# Patient Record
Sex: FEMALE | Race: WHITE | NOT HISPANIC OR LATINO | URBAN - METROPOLITAN AREA
[De-identification: names, ages, dates, MRNs, and addresses within clinical notes are randomized per-mention and may not be internally consistent; named-entity substitution may affect disease eponyms.]

---

## 2019-12-19 ENCOUNTER — OFFICE VISIT (OUTPATIENT)
Dept: URBAN - METROPOLITAN AREA CLINIC 17 | Facility: CLINIC | Age: 74
End: 2019-12-19
Payer: COMMERCIAL

## 2019-12-19 DIAGNOSIS — H25.813 COMBINED FORMS OF AGE-RELATED CATARACT, BILATERAL: Primary | ICD-10-CM

## 2019-12-19 PROCEDURE — 99213 OFFICE O/P EST LOW 20 MIN: CPT | Performed by: OPTOMETRIST

## 2019-12-19 ASSESSMENT — INTRAOCULAR PRESSURE
OS: 14
OD: 17

## 2019-12-19 NOTE — IMPRESSION/PLAN
Impression: Combined forms of age-related cataract, bilateral: H25.813. Plan: Cataracts account for the patient's complaints. No treatment currently recommended. The patient will monitor vision changes and contact us with any decrease in vision. Patient states she is not having trouble with vision at this time.

## 2021-02-10 ENCOUNTER — OFFICE VISIT (OUTPATIENT)
Dept: URBAN - METROPOLITAN AREA CLINIC 17 | Facility: CLINIC | Age: 76
End: 2021-02-10
Payer: COMMERCIAL

## 2021-02-10 DIAGNOSIS — H11.153 PINGUECULA, BILATERAL: ICD-10-CM

## 2021-02-10 PROCEDURE — 99213 OFFICE O/P EST LOW 20 MIN: CPT | Performed by: OPTOMETRIST

## 2021-02-10 ASSESSMENT — INTRAOCULAR PRESSURE
OS: 18
OD: 17

## 2021-02-10 NOTE — IMPRESSION/PLAN
Impression: Combined forms of age-related cataract, bilateral: H25.813. Plan: Discussed diagnosis with patient. Cataract evaluation is recommended. Will refer to Dr. Benito Mohan for CAT Eval. Pt wishes to proceed. Discussed with patient she does not meet legal requirements for driving. Patient wishes to schedule appt after having COVID vaccine.

## 2021-03-29 ENCOUNTER — OFFICE VISIT (OUTPATIENT)
Dept: URBAN - METROPOLITAN AREA CLINIC 17 | Facility: CLINIC | Age: 76
End: 2021-03-29
Payer: COMMERCIAL

## 2021-03-29 DIAGNOSIS — H25.13 AGE-RELATED NUCLEAR CATARACT, BILATERAL: Primary | ICD-10-CM

## 2021-03-29 DIAGNOSIS — H43.811 VITREOUS DEGENERATION, RIGHT EYE: ICD-10-CM

## 2021-03-29 PROCEDURE — 99204 OFFICE O/P NEW MOD 45 MIN: CPT | Performed by: OPHTHALMOLOGY

## 2021-03-29 ASSESSMENT — VISUAL ACUITY
OD: 20/30
OS: 20/30

## 2021-03-29 ASSESSMENT — KERATOMETRY
OD: 47.50
OS: 47.00

## 2021-03-29 ASSESSMENT — INTRAOCULAR PRESSURE
OD: 16
OS: 15

## 2021-03-29 NOTE — IMPRESSION/PLAN
Impression: Age-related nuclear cataract, bilateral: H25.13. Condition: established, worsening. Symptoms: could improve with surgery. Plan: Cataract accounts for patient's complaints. Reviewed risks, benefits, and procedure. Patient desires surgery, schedule ce/iol OS then OD, RL2, discussed lens options, distance refractive target, patient is clear for surgery in [facility].

## 2021-04-05 ENCOUNTER — PRE-OPERATIVE VISIT (OUTPATIENT)
Dept: URBAN - METROPOLITAN AREA CLINIC 17 | Facility: CLINIC | Age: 76
End: 2021-04-05
Payer: COMMERCIAL

## 2021-04-05 ASSESSMENT — PACHYMETRY
OS: 222.32
OD: 22.21
OD: 2.93
OS: 2.90

## 2021-04-19 ENCOUNTER — SURGERY (OUTPATIENT)
Dept: URBAN - METROPOLITAN AREA SURGERY 7 | Facility: SURGERY | Age: 76
End: 2021-04-19
Payer: COMMERCIAL

## 2021-04-19 PROCEDURE — 66984 XCAPSL CTRC RMVL W/O ECP: CPT | Performed by: OPHTHALMOLOGY

## 2021-04-20 ENCOUNTER — POST-OPERATIVE VISIT (OUTPATIENT)
Dept: URBAN - METROPOLITAN AREA CLINIC 17 | Facility: CLINIC | Age: 76
End: 2021-04-20
Payer: COMMERCIAL

## 2021-04-20 PROCEDURE — 99024 POSTOP FOLLOW-UP VISIT: CPT | Performed by: OPTOMETRIST

## 2021-04-20 ASSESSMENT — INTRAOCULAR PRESSURE
OS: 15
OD: 28
OD: 11

## 2021-04-20 NOTE — IMPRESSION/PLAN
Impression: S/P Cataract Extraction by phacoemulsification with IOL placement [id number] OS - 1 Day. Encounter for surgical aftercare following surgery on a sense organ  Z48.810. Post operative instructions reviewed - Plan: Return in 1 wk for PO2 --Advised patient to use artificial tears for comfort.

## 2021-04-26 ENCOUNTER — POST-OPERATIVE VISIT (OUTPATIENT)
Dept: URBAN - METROPOLITAN AREA CLINIC 17 | Facility: CLINIC | Age: 76
End: 2021-04-26
Payer: COMMERCIAL

## 2021-04-26 PROCEDURE — 99024 POSTOP FOLLOW-UP VISIT: CPT | Performed by: OPTOMETRIST

## 2021-04-26 ASSESSMENT — INTRAOCULAR PRESSURE
OD: 13
OS: 11

## 2021-04-26 ASSESSMENT — VISUAL ACUITY: OS: 20/30-

## 2021-04-26 NOTE — IMPRESSION/PLAN
Impression: S/P Cataract Extraction by phacoemulsification with IOL placement [id number] OS - 7 Days. Encounter for surgical aftercare following surgery on a sense organ  Z48.810. Plan: 1 wk, 2nd eye --Advised patient to use artificial tears for comfort.

## 2021-05-03 ENCOUNTER — SURGERY (OUTPATIENT)
Dept: URBAN - METROPOLITAN AREA SURGERY 7 | Facility: SURGERY | Age: 76
End: 2021-05-03
Payer: COMMERCIAL

## 2021-05-03 PROCEDURE — 66984 XCAPSL CTRC RMVL W/O ECP: CPT | Performed by: OPHTHALMOLOGY

## 2021-05-04 ENCOUNTER — POST-OPERATIVE VISIT (OUTPATIENT)
Dept: URBAN - METROPOLITAN AREA CLINIC 17 | Facility: CLINIC | Age: 76
End: 2021-05-04
Payer: COMMERCIAL

## 2021-05-04 PROCEDURE — 99024 POSTOP FOLLOW-UP VISIT: CPT | Performed by: OPTOMETRIST

## 2021-05-04 ASSESSMENT — INTRAOCULAR PRESSURE
OD: 17
OS: 11

## 2021-05-11 ENCOUNTER — POST-OPERATIVE VISIT (OUTPATIENT)
Dept: URBAN - METROPOLITAN AREA CLINIC 17 | Facility: CLINIC | Age: 76
End: 2021-05-11
Payer: COMMERCIAL

## 2021-05-11 PROCEDURE — 99024 POSTOP FOLLOW-UP VISIT: CPT | Performed by: OPTOMETRIST

## 2021-05-11 ASSESSMENT — INTRAOCULAR PRESSURE
OD: 14
OS: 15

## 2021-05-11 ASSESSMENT — VISUAL ACUITY
OD: 20/40
OS: 20/25

## 2021-05-11 NOTE — IMPRESSION/PLAN
Impression: S/P Cataract Extraction by phacoemulsification with IOL placement [id number] OD - 8 Days. Encounter for surgical aftercare following surgery on a sense organ  Z48.810. Plan: 3 WK PO3 --Advised patient to use artificial tears for comfort. 
***Dexycu

## 2021-06-07 ENCOUNTER — POST-OPERATIVE VISIT (OUTPATIENT)
Dept: URBAN - METROPOLITAN AREA CLINIC 17 | Facility: CLINIC | Age: 76
End: 2021-06-07
Payer: COMMERCIAL

## 2021-06-07 DIAGNOSIS — T85.29XA MECH COMPL OF INTRAOCULAR LENS, INITIAL ENCOUNTER: ICD-10-CM

## 2021-06-07 PROCEDURE — 99024 POSTOP FOLLOW-UP VISIT: CPT | Performed by: OPHTHALMOLOGY

## 2021-06-07 ASSESSMENT — VISUAL ACUITY
OD: 20/40
OS: 20/20

## 2021-06-07 ASSESSMENT — INTRAOCULAR PRESSURE
OS: 13
OD: 11

## 2021-06-07 NOTE — IMPRESSION/PLAN
Impression: [facility] compl of intraocular lens, initial encounter: T85.29xA. Plan: Explained to patient that we can go back to the OR and exchange the IOL for a dist target but she will need glasses for computer work and reading up close. Patient desires surgery, schedule IOL exchange OS then OD, RL2, standard lens, distance refractive target, patient is clear for surgery in [place] [address].

## 2021-07-01 ENCOUNTER — SURGERY (OUTPATIENT)
Dept: URBAN - METROPOLITAN AREA SURGERY 7 | Facility: SURGERY | Age: 76
End: 2021-07-01
Payer: COMMERCIAL

## 2021-07-01 PROCEDURE — 66986 EXCHANGE LENS PROSTHESIS: CPT | Performed by: OPHTHALMOLOGY

## 2021-07-02 ENCOUNTER — POST-OPERATIVE VISIT (OUTPATIENT)
Dept: URBAN - METROPOLITAN AREA CLINIC 17 | Facility: CLINIC | Age: 76
End: 2021-07-02
Payer: COMMERCIAL

## 2021-07-02 PROCEDURE — 99024 POSTOP FOLLOW-UP VISIT: CPT | Performed by: OPHTHALMOLOGY

## 2021-07-02 ASSESSMENT — INTRAOCULAR PRESSURE
OS: 13
OD: 14

## 2021-07-02 NOTE — IMPRESSION/PLAN
Impression: S/P IOL Exchange OS - 1 Day. Encounter for surgical aftercare following surgery on a sense organ  Z48.810. Plan: Call if any problems develop. Do not rub operated eye. Wear eye shield for 1 week when sleeping. Red eye warning given. No jarring activity. --Advised patient to use artificial tears for comfort.

## 2021-07-08 ENCOUNTER — POST-OPERATIVE VISIT (OUTPATIENT)
Dept: URBAN - METROPOLITAN AREA CLINIC 17 | Facility: CLINIC | Age: 76
End: 2021-07-08
Payer: COMMERCIAL

## 2021-07-08 DIAGNOSIS — Z48.810 ENCOUNTER FOR SURGICAL AFTERCARE FOLLOWING SURGERY ON A SENSE ORGAN: Primary | ICD-10-CM

## 2021-07-08 PROCEDURE — 99024 POSTOP FOLLOW-UP VISIT: CPT | Performed by: OPTOMETRIST

## 2021-07-08 ASSESSMENT — INTRAOCULAR PRESSURE
OD: 12
OS: 18

## 2021-07-08 NOTE — IMPRESSION/PLAN
Impression: S/P IOL Exchange OS - 7 Days. Encounter for surgical aftercare following surgery on a sense organ  Z48.810. Post operative instructions reviewed - Plan: 2nd eye order --Advised patient to use artificial tears for comfort.

## 2021-07-15 ENCOUNTER — SURGERY (OUTPATIENT)
Dept: URBAN - METROPOLITAN AREA SURGERY 7 | Facility: SURGERY | Age: 76
End: 2021-07-15
Payer: COMMERCIAL

## 2021-07-15 PROCEDURE — 66986 EXCHANGE LENS PROSTHESIS: CPT | Performed by: OPHTHALMOLOGY

## 2021-07-16 ENCOUNTER — POST-OPERATIVE VISIT (OUTPATIENT)
Dept: URBAN - METROPOLITAN AREA CLINIC 17 | Facility: CLINIC | Age: 76
End: 2021-07-16
Payer: COMMERCIAL

## 2021-07-16 PROCEDURE — 99024 POSTOP FOLLOW-UP VISIT: CPT | Performed by: OPTOMETRIST

## 2021-07-16 ASSESSMENT — INTRAOCULAR PRESSURE
OS: 13
OD: 16

## 2021-07-16 NOTE — IMPRESSION/PLAN
Impression: S/P IOL Exchange 71436 OD - 1 Day. Presence of intraocular lens  Z96.1. Plan: Return in 1 week --Advised patient to use artificial tears for comfort.

## 2021-07-22 ENCOUNTER — POST-OPERATIVE VISIT (OUTPATIENT)
Dept: URBAN - METROPOLITAN AREA CLINIC 17 | Facility: CLINIC | Age: 76
End: 2021-07-22
Payer: COMMERCIAL

## 2021-07-22 PROCEDURE — 99024 POSTOP FOLLOW-UP VISIT: CPT | Performed by: OPTOMETRIST

## 2021-07-22 ASSESSMENT — INTRAOCULAR PRESSURE
OS: 13
OD: 13

## 2021-07-22 ASSESSMENT — VISUAL ACUITY: OD: 20/70

## 2021-07-22 NOTE — IMPRESSION/PLAN
Impression: S/P IOL Exchange 58309 OD - 7 Days. Presence of intraocular lens  Z96.1. Plan: Return in 3 weeks.

## 2021-09-01 ENCOUNTER — POST-OPERATIVE VISIT (OUTPATIENT)
Dept: URBAN - METROPOLITAN AREA CLINIC 17 | Facility: CLINIC | Age: 76
End: 2021-09-01
Payer: COMMERCIAL

## 2021-09-01 DIAGNOSIS — Z96.1 PRESENCE OF INTRAOCULAR LENS: Primary | ICD-10-CM

## 2021-09-01 PROCEDURE — 99024 POSTOP FOLLOW-UP VISIT: CPT | Performed by: OPTOMETRIST

## 2021-09-01 ASSESSMENT — INTRAOCULAR PRESSURE
OS: 17
OD: 14

## 2021-09-01 NOTE — IMPRESSION/PLAN
Impression: S/P IOL Exchange 26001 OD - 48 Days. Presence of intraocular lens  Z96.1. Plan: Return to [facility] for refraction.  3 month DE.

## 2022-01-25 ENCOUNTER — OFFICE VISIT (OUTPATIENT)
Dept: URBAN - METROPOLITAN AREA CLINIC 17 | Facility: CLINIC | Age: 77
End: 2022-01-25
Payer: COMMERCIAL

## 2022-01-25 PROCEDURE — 99213 OFFICE O/P EST LOW 20 MIN: CPT | Performed by: OPTOMETRIST

## 2022-01-25 ASSESSMENT — INTRAOCULAR PRESSURE
OS: 15
OD: 16

## 2022-01-25 NOTE — IMPRESSION/PLAN
Impression: Presence of intraocular lens: Z96.1. Left. Bilateral. Plan: IOL(s) in good position. Continue to monitor with yearly dilated eye exams.

## 2023-04-18 ENCOUNTER — OFFICE VISIT (OUTPATIENT)
Dept: URBAN - METROPOLITAN AREA CLINIC 17 | Facility: CLINIC | Age: 78
End: 2023-04-18
Payer: COMMERCIAL

## 2023-04-18 DIAGNOSIS — H43.811 VITREOUS DEGENERATION, RIGHT EYE: ICD-10-CM

## 2023-04-18 DIAGNOSIS — Z96.1 PRESENCE OF INTRAOCULAR LENS: ICD-10-CM

## 2023-04-18 DIAGNOSIS — H18.513 ENDOTHELIAL CORNEAL DYSTROPHY, BILATERAL: Primary | ICD-10-CM

## 2023-04-18 PROCEDURE — 99213 OFFICE O/P EST LOW 20 MIN: CPT | Performed by: OPTOMETRIST

## 2023-04-18 ASSESSMENT — INTRAOCULAR PRESSURE
OS: 18
OD: 20

## 2023-04-18 NOTE — IMPRESSION/PLAN
Impression: Endothelial corneal dystrophy, bilateral: H18.513. Plan: No treatment is required at this time. Will continue to observe condition and or symptoms.

## 2024-11-26 ENCOUNTER — OFFICE VISIT (OUTPATIENT)
Dept: URBAN - METROPOLITAN AREA CLINIC 17 | Facility: CLINIC | Age: 79
End: 2024-11-26
Payer: COMMERCIAL

## 2024-11-26 DIAGNOSIS — H43.811 VITREOUS DEGENERATION, RIGHT EYE: ICD-10-CM

## 2024-11-26 DIAGNOSIS — Z96.1 PRESENCE OF INTRAOCULAR LENS: ICD-10-CM

## 2024-11-26 DIAGNOSIS — H18.593 OTHER HEREDITARY CORNEAL DYSTROPHIES, BILATERAL: Primary | ICD-10-CM

## 2024-11-26 PROCEDURE — 92014 COMPRE OPH EXAM EST PT 1/>: CPT | Performed by: OPTOMETRIST

## 2024-11-26 ASSESSMENT — INTRAOCULAR PRESSURE
OS: 6
OD: 8